# Patient Record
Sex: FEMALE
[De-identification: names, ages, dates, MRNs, and addresses within clinical notes are randomized per-mention and may not be internally consistent; named-entity substitution may affect disease eponyms.]

---

## 2024-03-13 ENCOUNTER — APPOINTMENT (OUTPATIENT)
Dept: DERMATOLOGY | Facility: CLINIC | Age: 19
End: 2024-03-13
Payer: MEDICAID

## 2024-03-13 PROCEDURE — D0065: CPT

## 2024-03-13 PROCEDURE — D0067: CPT

## 2024-03-13 PROCEDURE — D0068: CPT

## 2024-03-13 PROCEDURE — D0063: CPT

## 2024-03-13 PROCEDURE — D0064: CPT

## 2024-03-13 NOTE — ASSESSMENT
[FreeTextEntry1] : Risks and benefits of GMax laser were discussed including blistering, crusting, discoloration, lack of response, and need for multiple treatments;  GMax laser- Treatment #:  1  ND YAG 16J face and axillae, 14J Chest and abdomen 18 mm spot size, 10 msec  DPP face, RPP axillae, YPP for chest, YPP for abdomen

## 2024-05-15 ENCOUNTER — APPOINTMENT (OUTPATIENT)
Dept: DERMATOLOGY | Facility: CLINIC | Age: 19
End: 2024-05-15

## 2024-05-30 ENCOUNTER — APPOINTMENT (OUTPATIENT)
Dept: DERMATOLOGY | Facility: CLINIC | Age: 19
End: 2024-05-30
Payer: MEDICAID

## 2024-05-30 DIAGNOSIS — L68.0 HIRSUTISM: ICD-10-CM

## 2024-05-30 PROCEDURE — D0063: CPT

## 2024-05-30 PROCEDURE — D0068: CPT

## 2024-05-30 PROCEDURE — D0065: CPT

## 2024-05-30 PROCEDURE — D0064: CPT

## 2024-05-30 PROCEDURE — D0067: CPT

## 2024-05-30 NOTE — ASSESSMENT
[FreeTextEntry1] : Risks and benefits of GMax laser were discussed including blistering, crusting, discoloration, lack of response, and need for multiple treatments;  GMax laser- Treatment #:  2  ND YAG 16J face and axillae, 14J Chest and abdomen 18 mm spot size, 10 msec  TPP face, RPP axillae, YPP for chest, YPP for abdomen

## 2024-06-12 ENCOUNTER — APPOINTMENT (OUTPATIENT)
Dept: DERMATOLOGY | Facility: CLINIC | Age: 19
End: 2024-06-12

## 2024-07-03 ENCOUNTER — APPOINTMENT (OUTPATIENT)
Dept: DERMATOLOGY | Facility: CLINIC | Age: 19
End: 2024-07-03
Payer: SELF-PAY

## 2024-07-03 DIAGNOSIS — L68.0 HIRSUTISM: ICD-10-CM

## 2024-07-03 PROCEDURE — D0068: CPT

## 2024-07-03 PROCEDURE — D0066: CPT

## 2024-07-03 PROCEDURE — D0064: CPT

## 2024-07-03 PROCEDURE — D0065: CPT

## 2024-07-03 PROCEDURE — D0067: CPT

## 2024-07-03 PROCEDURE — D0063: CPT

## 2024-08-14 ENCOUNTER — APPOINTMENT (OUTPATIENT)
Dept: DERMATOLOGY | Facility: CLINIC | Age: 19
End: 2024-08-14
Payer: MEDICAID

## 2024-08-14 DIAGNOSIS — L68.0 HIRSUTISM: ICD-10-CM

## 2024-08-14 PROCEDURE — D0064: CPT

## 2024-08-14 PROCEDURE — D0067: CPT

## 2024-08-14 PROCEDURE — D0068: CPT

## 2024-08-14 PROCEDURE — D0066: CPT

## 2024-08-14 PROCEDURE — D0065: CPT

## 2024-08-14 PROCEDURE — D0063: CPT

## 2024-08-14 NOTE — ASSESSMENT
[FreeTextEntry1] : Risks and benefits of GMax laser were discussed including blistering, crusting, discoloration, lack of response, and need for multiple treatments;  GMax laser- Treatment #:  4  ND YAG 16J face and axillae, 14J Chest and abdomen 18 mm spot size, 10 msec  TPP face, RPP axillae, YPP for chest, YPP for abdomen

## 2024-08-19 ENCOUNTER — APPOINTMENT (OUTPATIENT)
Dept: ALLERGY | Facility: CLINIC | Age: 19
End: 2024-08-19
Payer: MEDICAID

## 2024-08-19 ENCOUNTER — NON-APPOINTMENT (OUTPATIENT)
Age: 19
End: 2024-08-19

## 2024-08-19 VITALS
OXYGEN SATURATION: 98 % | SYSTOLIC BLOOD PRESSURE: 90 MMHG | TEMPERATURE: 98 F | HEART RATE: 70 BPM | DIASTOLIC BLOOD PRESSURE: 58 MMHG

## 2024-08-19 DIAGNOSIS — J30.1 ALLERGIC RHINITIS DUE TO POLLEN: ICD-10-CM

## 2024-08-19 PROCEDURE — 95004 PERQ TESTS W/ALRGNC XTRCS: CPT

## 2024-08-19 PROCEDURE — 99203 OFFICE O/P NEW LOW 30 MIN: CPT | Mod: 25

## 2024-08-19 RX ORDER — AZELASTINE HYDROCHLORIDE 137 UG/1
137 SPRAY, METERED NASAL
Qty: 1 | Refills: 1 | Status: ACTIVE | COMMUNITY
Start: 2024-08-19 | End: 1900-01-01

## 2024-08-19 NOTE — HISTORY OF PRESENT ILLNESS
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    She uses a K95 mask if she goes to building and she can make it thru the day.    If she doesn't wear a mask she will need to sleep the remainder of the day.   History of spring and fall seasonal allergies.   Dust will also exacerbate her symptoms.   Family from [place].

## 2024-08-19 NOTE — ASSESSMENT
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

## 2024-08-19 NOTE — PHYSICAL EXAM
[Alert] : alert [Well Nourished] : well nourished [Healthy Appearance] : healthy appearance [No Acute Distress] : no acute distress [Well Developed] : well developed [Normal Voice/Communication] : normal voice communication [No Neck Mass] : no neck mass was observed [No LAD] : no lymphadenopathy [No Thyroid Mass] : no thyroid mass [Normal Rate and Effort] : normal respiratory rhythm and effort [No Crackles] : no crackles [No Retractions] : no retractions [Bilateral Audible Breath Sounds] : bilateral audible breath sounds [Normal Rate] : heart rate was normal  [Normal S1, S2] : normal S1 and S2 [No murmur] : no murmur [Regular Rhythm] : with a regular rhythm [Skin Intact] : skin intact  [No Rash] : no rash [Normal Mood] : mood was normal [Normal Affect] : affect was normal [Judgment and Insight Age Appropriate] : judgement and insight is age appropriate [Wheezing] : no wheezing was heard

## 2024-08-19 NOTE — SOCIAL HISTORY
[Apartment] : [unfilled] lives in an apartment  [Radiator/Baseboard] : heating provided by radiator(s)/baseboard(s) [Bedroom] :  in bedroom [Living Area] : in living area [None] : none [Single] : single [FreeTextEntry1] : Starting senior year of college  Lives with mother  [Smokers in Household] : there are no smokers in the home [de-identified] : separate unit in vent

## 2025-01-02 ENCOUNTER — APPOINTMENT (OUTPATIENT)
Dept: DERMATOLOGY | Facility: CLINIC | Age: 20
End: 2025-01-02
Payer: MEDICAID

## 2025-01-02 DIAGNOSIS — L68.0 HIRSUTISM: ICD-10-CM

## 2025-01-02 PROCEDURE — D0065: CPT

## 2025-01-02 PROCEDURE — D0066: CPT

## 2025-01-02 PROCEDURE — D0064: CPT

## 2025-01-02 PROCEDURE — D0067: CPT

## 2025-01-02 PROCEDURE — D0063: CPT

## 2025-01-02 PROCEDURE — D0068: CPT

## 2025-01-06 ENCOUNTER — APPOINTMENT (OUTPATIENT)
Dept: OBGYN | Facility: CLINIC | Age: 20
End: 2025-01-06

## 2025-01-06 VITALS
DIASTOLIC BLOOD PRESSURE: 72 MMHG | WEIGHT: 118 LBS | BODY MASS INDEX: 23.16 KG/M2 | SYSTOLIC BLOOD PRESSURE: 103 MMHG | HEIGHT: 60 IN | HEART RATE: 113 BPM

## 2025-01-06 DIAGNOSIS — N63.12 UNSPECIFIED LUMP IN THE RIGHT BREAST, UPPER INNER QUADRANT: ICD-10-CM

## 2025-01-06 DIAGNOSIS — Z83.49 FAMILY HISTORY OF OTHER ENDOCRINE, NUTRITIONAL AND METABOLIC DISEASES: ICD-10-CM

## 2025-01-06 DIAGNOSIS — Z01.419 ENCOUNTER FOR GYNECOLOGICAL EXAMINATION (GENERAL) (ROUTINE) W/OUT ABNORMAL FINDINGS: ICD-10-CM

## 2025-01-06 DIAGNOSIS — Z80.7 FAMILY HISTORY OF OTHER MALIGNANT NEOPLASMS OF LYMPHOID, HEMATOPOIETIC AND RELATED TISSUES: ICD-10-CM

## 2025-01-06 DIAGNOSIS — Z83.3 FAMILY HISTORY OF DIABETES MELLITUS: ICD-10-CM

## 2025-01-06 PROCEDURE — 99385 PREV VISIT NEW AGE 18-39: CPT | Mod: 25

## 2025-01-06 PROCEDURE — 99459 PELVIC EXAMINATION: CPT

## 2025-01-06 PROCEDURE — 96127 BRIEF EMOTIONAL/BEHAV ASSMT: CPT

## 2025-01-18 PROBLEM — Z80.7 FAMILY HISTORY OF MULTIPLE MYELOMA: Status: ACTIVE | Noted: 2025-01-18

## 2025-01-18 PROBLEM — Z83.3 FAMILY HISTORY OF DIABETES MELLITUS: Status: ACTIVE | Noted: 2025-01-18

## 2025-01-18 PROBLEM — Z83.49 FAMILY HISTORY OF THYROID DISEASE: Status: ACTIVE | Noted: 2025-01-18

## 2025-01-18 LAB
C TRACH RRNA SPEC QL NAA+PROBE: NOT DETECTED
CYTOLOGY CVX/VAG DOC THIN PREP: NORMAL
N GONORRHOEA RRNA SPEC QL NAA+PROBE: NOT DETECTED
SOURCE TP AMPLIFICATION: NORMAL

## 2025-04-09 ENCOUNTER — NON-APPOINTMENT (OUTPATIENT)
Age: 20
End: 2025-04-09

## 2025-04-09 ENCOUNTER — APPOINTMENT (OUTPATIENT)
Dept: ULTRASOUND IMAGING | Facility: IMAGING CENTER | Age: 20
End: 2025-04-09